# Patient Record
Sex: FEMALE | Race: BLACK OR AFRICAN AMERICAN | Employment: FULL TIME | URBAN - METROPOLITAN AREA
[De-identification: names, ages, dates, MRNs, and addresses within clinical notes are randomized per-mention and may not be internally consistent; named-entity substitution may affect disease eponyms.]

---

## 2019-04-08 ENCOUNTER — HOSPITAL ENCOUNTER (EMERGENCY)
Age: 34
Discharge: HOME | End: 2019-04-08
Attending: EMERGENCY MEDICINE | Admitting: EMERGENCY MEDICINE
Payer: MEDICAID

## 2019-04-08 VITALS
DIASTOLIC BLOOD PRESSURE: 92 MMHG | HEIGHT: 66 IN | RESPIRATION RATE: 18 BRPM | WEIGHT: 215 LBS | TEMPERATURE: 98.3 F | HEART RATE: 78 BPM | BODY MASS INDEX: 34.55 KG/M2 | OXYGEN SATURATION: 100 % | SYSTOLIC BLOOD PRESSURE: 128 MMHG

## 2019-04-08 DIAGNOSIS — T16.2XXA FOREIGN BODY OF LEFT EAR, INITIAL ENCOUNTER: Primary | ICD-10-CM

## 2019-04-08 PROCEDURE — 99282 EMERGENCY DEPT VISIT SF MDM: CPT

## 2019-04-08 PROCEDURE — 75810000145 HC RMVL FB EAR W/O GEN ANES

## 2019-04-09 NOTE — ED PROVIDER NOTES
EMERGENCY DEPARTMENT HISTORY AND PHYSICAL EXAM 
 
Date: 2019 Patient Name: Sandra Lewis History of Presenting Illness Chief Complaint Patient presents with  Foreign Body in [address] [place] [address] History Provided By: Patient Sandra Lewis is a 35 y.o. female presents to the emergency department C/O foreign body left ear. Associated sxs include decreased hearing left ear pain to left EAC. Patient reports night notes this morning part of it she has foreign body sensation to her left ear muffled hearing. pt denies bleeding from the ear drainage from the ear, and any other sxs or complaints. PCP: None Past History Past Medical History: 
History reviewed. No pertinent past medical history. Past Surgical History: 
Past Surgical History:  
Procedure Laterality Date  HX  SECTION Family History: 
History reviewed. No pertinent family history. Social History: 
Social History Tobacco Use  Smoking status: Former Smoker Types: Cigarettes  Smokeless tobacco: Never Used Substance Use Topics  Alcohol use: Not Currently Comment: drinks daily  Drug use: Not Currently Types: Marijuana, Cocaine, Opiates Allergies: 
No Known Allergies Review of Systems Review of Systems Constitutional: Negative for fever. HENT: Positive for ear pain. Negative for ear discharge. All other systems reviewed and are negative. Physical Exam  
 
Vitals:  
 19 1956 BP: (!) 128/92 Pulse: 78 Resp: 18 Temp: 98.3 °F (36.8 °C) SpO2: 100% Weight: 97.5 kg (215 lb) Height: 5' 6\" (1.676 m) Physical Exam  
Constitutional: She is oriented to person, place, and time. She appears well-developed and well-nourished. No distress. HENT:  
Head: Normocephalic. Blue yellow foreign body noted to left EAC. No bleeding or drainage Eyes: Pupils are equal, round, and reactive to light.  Conjunctivae and EOM are normal.  
 Neck: Normal range of motion. Neck supple. Musculoskeletal: Normal range of motion. Neurological: She is alert and oriented to person, place, and time. Skin: Skin is warm and dry. Psychiatric: She has a normal mood and affect. Her behavior is normal.  
Nursing note and vitals reviewed. Diagnostic Study Results Labs - No results found for this or any previous visit (from the past 12 hour(s)). Radiologic Studies - No orders to display CT Results  (Last 48 hours) None CXR Results  (Last 48 hours) None Medications given in the ED- Medications - No data to display Medical Decision Making I am the first provider for this patient. I reviewed the vital signs, available nursing notes, past medical history, past surgical history, family history and social history. Vital Signs-Reviewed the patient's vital signs. Records Reviewed: Nursing Notes Procedures: 
Foreign Body Removal 
Date/Time: 4/8/2019 8:05 PM 
Performed by: GURWINDER Gomes Authorized by: GURWINDER Gomes Consent:  
  Consent obtained:  Verbal 
  Consent given by:  Patient Risks discussed:  Bleeding, infection, pain, worsening of condition and incomplete removal 
  Alternatives discussed:  No treatment and referral 
Location: Location:  Ear Ear location:  L ear Pre-procedure details:  
  Imaging:  None Neurovascular status: intact Anesthesia (see MAR for exact dosages): Anesthesia method:  None Procedure type:  
  Procedure complexity:  Simple Procedure details: Localization method:  Visualized Dissection of underlying tissues: no   
  Bloodless field: yes Removal mechanism: Forceps Foreign bodies recovered:  1 Description:  Blue & yellow small rubery peice Intact foreign body removal: yes Post-procedure details:  
  Neurovascular status: intact Confirmation:  No additional foreign bodies on visualization Skin closure:  None Patient tolerance of procedure: Tolerated well, no immediate complications ED Course:  
Initial assessment performed. The patients presenting problems have been discussed, and they are in agreement with the care plan formulated and outlined with them. I have encouraged them to ask questions as they arise throughout their visit. Discussion: 35 y.o. female foreign body in the ear for 24 hours. Foreign body removed intact TM normal no drainage bleeding or puncture. Diagnosis and Disposition DISCHARGE NOTE: 
Kamari Richards's  results have been reviewed with her. She has been counseled regarding her diagnosis, treatment, and plan. She verbally conveys understanding and agreement of the signs, symptoms, diagnosis, treatment and prognosis and additionally agrees to follow up as discussed. She also agrees with the care-plan and conveys that all of her questions have been answered. I have also provided discharge instructions for her that include: educational information regarding their diagnosis and treatment, and list of reasons why they would want to return to the ED prior to their follow-up appointment, should her condition change. She has been provided with education for proper emergency department utilization. CLINICAL IMPRESSION: 
 
1. Foreign body of left ear, initial encounter PLAN: 
1. D/C Home 2. Current Discharge Medication List  
  
START taking these medications Details  
ciprofloxacin-hydrocortisone (CIPRO HC OTIC) otic suspension Administer 3 Drops in left ear two (2) times a day. Qty: 10 mL, Refills: 0  
  
  
 
3. Follow-up Information Follow up With Specialties Details Why Contact Info  
 your doctor  Schedule an appointment as soon as possible for a visit [facility] EMERGENCY DEPT Emergency Medicine  As needed, If symptoms worsen [address] [address] [phone] [facility]   for primary care follow up [address], [address] [address] [phone] Please note that this dictation was completed with [employer], the computer voice recognition software. Quite often unanticipated grammatical, syntax, homophones, and other interpretive errors are inadvertently transcribed by the computer software. Please disregard these errors. Please excuse any errors that have escaped final proofreading.

## 2019-04-09 NOTE — DISCHARGE INSTRUCTIONS
Patient Education        Object in the Ear: Care Instructions  Your Care Instructions  An insect or an object in the ear usually does not damage the ear. But some objects in the ear can cause problems. For example, dry food can expand in the ear, and a battery can release chemicals. Objects that have been in the ear for longer than 24 hours are harder to remove and can cause pain, infection, or bleeding. If an object is pushed hard into the ear, it may damage the eardrum. Your doctor probably removed the object from your ear during your exam. Your ear may feel tender for a few days. Follow-up care is a key part of your treatment and safety. Be sure to make and go to all appointments, and call your doctor if you are having problems. It's also a good idea to know your test results and keep a list of the medicines you take. How can you care for yourself at home? · Your doctor may have used medicine to numb your ear. When it wears off, your ear pain may return. Take an over-the-counter pain medicine, such as acetaminophen (Tylenol), ibuprofen (Advil, Motrin), or naproxen (Aleve). Read and follow all instructions on the label. · Do not take two or more pain medicines at the same time unless the doctor told you to. Many pain medicines have acetaminophen, which is Tylenol. Too much acetaminophen (Tylenol) can be harmful. · If your doctor prescribed antibiotics, take them as directed. Do not stop taking them just because you feel better. You need to take the full course of antibiotics. · Your doctor may prescribe eardrops. To put in eardrops:  ? First warm the drops by rolling the container in your hands or placing it in your armpit for a few minutes. Putting cold eardrops in your ear can cause ear pain and dizziness. ? Lie down, with your ear facing up. ? Place the prescribed amount of drops on the inside wall of the ear canal. Gently wiggle the outer ear to help the drops move down into the ear.   ? It's important to keep the liquid in the ear canal for 3 to 5 minutes. · You can put heat on the ear to relieve pain. Use a warm washcloth or a heating pad set on low. · Do not put cotton swabs, marie pins, or other objects in the ear. Do not put any liquids in the ear, unless your doctor directs you to. When should you call for help? Call your doctor now or seek immediate medical care if:    · You have symptoms of an ear infection, such as:  ? You have new or worse pain, swelling, warmth, or redness around or behind your ear.  ? You have a fever with a stiff neck or severe headache.    Watch closely for changes in your health, and be sure to contact your doctor if:    · You are not getting better after 2 days (48 hours).     · You have new or worse symptoms. Where can you learn more? Go to http://cedrick-smiley.info/. Enter C171 in the search box to learn more about \"Object in the Ear: Care Instructions. \"  Current as of: September 23, 2018  Content Version: 11.9  © 1447-4349 [employer]. Care instructions adapted under license by [employer] (which disclaims liability or warranty for this information). If you have questions about a medical condition or this instruction, always ask your healthcare professional. [address] any warranty or liability for your use of this information.

## 2019-05-17 ENCOUNTER — HOSPITAL ENCOUNTER (EMERGENCY)
Age: 34
Discharge: HOME | End: 2019-05-17
Attending: EMERGENCY MEDICINE
Payer: MEDICAID

## 2019-05-17 ENCOUNTER — APPOINTMENT (OUTPATIENT)
Dept: GENERAL RADIOLOGY | Age: 34
End: 2019-05-17
Attending: PHYSICIAN ASSISTANT
Payer: MEDICAID

## 2019-05-17 VITALS
WEIGHT: 210 LBS | DIASTOLIC BLOOD PRESSURE: 78 MMHG | SYSTOLIC BLOOD PRESSURE: 122 MMHG | BODY MASS INDEX: 33.75 KG/M2 | OXYGEN SATURATION: 100 % | HEART RATE: 87 BPM | TEMPERATURE: 98.4 F | RESPIRATION RATE: 16 BRPM | HEIGHT: 66 IN
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
## 2019-05-17 DIAGNOSIS — S63.501A RIGHT WRIST SPRAIN, INITIAL ENCOUNTER: ICD-10-CM

## 2019-05-17 DIAGNOSIS — S20.211A RIB CONTUSION, RIGHT, INITIAL ENCOUNTER: Primary | ICD-10-CM

## 2019-05-17 DIAGNOSIS — S40.811A ABRASION OF RIGHT ARM, INITIAL ENCOUNTER: ICD-10-CM

## 2019-05-17 PROCEDURE — 71101 X-RAY EXAM UNILAT RIBS/CHEST: CPT

## 2019-05-17 PROCEDURE — 99282 EMERGENCY DEPT VISIT SF MDM: CPT

## 2019-05-17 PROCEDURE — 73110 X-RAY EXAM OF WRIST: CPT

## 2019-05-17 PROCEDURE — L3908 WHO COCK-UP NONMOLDE PRE OTS: HCPCS

## 2019-05-17 RX ORDER — IBUPROFEN 600 MG/1
600 TABLET ORAL
Qty: 20 TAB | Refills: 0 | Status: SHIPPED | OUTPATIENT
Start: 2019-05-17
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
## 2021-09-22 ENCOUNTER — HOSPITAL ENCOUNTER (OUTPATIENT)
Dept: LAB | Age: 36
Discharge: HOME | End: 2021-09-22

## 2021-09-22 LAB — SENTARA SPECIMEN COL,SENBCF: NORMAL

## 2021-09-22 PROCEDURE — 99001 SPECIMEN HANDLING PT-LAB: CPT

## 2023-05-19 RX ORDER — IBUPROFEN 600 MG/1
600 TABLET ORAL EVERY 6 HOURS PRN
COMMUNITY
Start: 2019-05-17